# Patient Record
Sex: MALE | Race: WHITE
[De-identification: names, ages, dates, MRNs, and addresses within clinical notes are randomized per-mention and may not be internally consistent; named-entity substitution may affect disease eponyms.]

---

## 2017-04-23 ENCOUNTER — HOSPITAL ENCOUNTER (EMERGENCY)
Dept: HOSPITAL 74 - JERFT | Age: 37
Discharge: HOME | End: 2017-04-23
Payer: COMMERCIAL

## 2017-04-23 VITALS — DIASTOLIC BLOOD PRESSURE: 85 MMHG | SYSTOLIC BLOOD PRESSURE: 155 MMHG | TEMPERATURE: 98 F | HEART RATE: 75 BPM

## 2017-04-23 VITALS — BODY MASS INDEX: 30.9 KG/M2

## 2017-04-23 DIAGNOSIS — S93.411A: Primary | ICD-10-CM
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
## 2017-04-23 NOTE — PDOC
History of Present Illness





- General


Chief Complaint: Pain, Acute


Stated Complaint: RT ANKLE INJURY


Time Seen by Provider: 04/23/17 17:12


History Source: Patient


Exam Limitations: No Limitations





- History of Present Illness


Initial Comments: 





04/23/17 17:52


36-year-old male presents with right ankle pain after tripping and falling 

while at work yesterday. Patient is been applying ice and topical BenGay with 

no relief and now has difficulty going up and down steps. Patient denies 

previous injury to the affected area,  radiation of pain, or decreased 

sensation to the toes. 


Occurred: reports: yesterday


Severity: reports: mild


Pain Location: reports: lower extremity


Method of Injury: Yes: fall


Modifying Factors: improves with: None


Associated Symptoms (Fall): trouble walking





Past History





- Past Medical History


Allergies/Adverse Reactions: 


 Allergies











Allergy/AdvReac Type Severity Reaction Status Date / Time


 


No Known Allergies Allergy   Verified 04/23/17 16:45











Home Medications: 


Ambulatory Orders





NK [No Known Home Medication]  04/23/17 








Other medical history: DENIES.





- Psycho/Social/Smoking Cessation Hx


Anxiety: No


Suicidal Ideation: No


Smoking History: Never smoked


Hx Alcohol Use: Yes (SOMETIMES.)


Drug/Substance Use Hx: Yes (SOMETIMES "WEED".)


Substance Use Type: Alcohol, Marijuana


Patient Lives Alone: No


Lives with/in: spouse/SO





**Review of Systems





- Review of Systems


Able to Perform ROS?: Yes


Constitutional: No: Symptoms Reported


Musculoskeletal: Yes: Joint Pain (right ankle)


Integumentary: Yes: Bruising


Neurological: No: Symptoms reported





*Physical Exam





- Vital Signs


 Last Vital Signs











Temp Pulse Resp BP Pulse Ox


 


 98 F   75   18   155/85   100 


 


 04/23/17 16:45  04/23/17 16:45  04/23/17 16:45  04/23/17 16:45  04/23/17 16:45














- Physical Exam


General Appearance: Yes: Nourished, Appropriately Dressed.  No: Apparent 

Distress


Vascular Pulses: Dorsalis-Pedis (R): 2+


Extremity: positive: Normal Capillary Refill, Tender (lower lateral aspect of 

right mallelous )


Integumentary: positive: Ecchymosis (mild to right ankle region)


Neurologic: positive: Motor Strength 5/5 (ambulatory with limp)





ED Treatment Course





- RADIOLOGY


Radiology Studies Ordered: 














 Category Date Time Status


 


 ANKLE-RIGHT [RAD] Stat Radiology  04/23/17 17:31 Ordered














Medical Decision Making





- Medical Decision Making


04/23/17 17:57


Patient with injury to right ankle with edema, tenderness and ecchymosis to the 

lateral aspect of right malleolus. Patient ordered for x-ray and Motrin.





04/23/17 18:07


X-ray negative for fracture or dislocation or other acute pathology. Patient 

will be discharged home with an Ace wrap and recommended to follow the rice 

formula





*DC/Admit/Observation/Transfer


Diagnosis at time of Disposition: 


Right ankle sprain


Qualifiers:


 Encounter type: initial encounter Involved ligament of ankle: calcaneofibular 

ligament Qualified Code(s): S93.411A - Sprain of calcaneofibular ligament of 

right ankle, initial encounter





- Discharge Dispostion


Disposition: HOME


Condition at time of disposition: Good





- Referrals


Referrals: 


STAFF,NOT ON [Primary Care Provider] - 





- Patient Instructions


Printed Discharge Instructions:  DI for Ankle Sprain


Additional Instructions: 


Please use Ace wrap during the day but remove at night.


Elevate your extremity and apply ice to the ankle as much as you can tolerate 

for the next 2 days.


Take Motrin 400 mg every 8 hours.

## 2018-07-28 ENCOUNTER — HOSPITAL ENCOUNTER (EMERGENCY)
Dept: HOSPITAL 74 - JER | Age: 38
Discharge: HOME | End: 2018-07-28
Payer: COMMERCIAL

## 2018-07-28 VITALS — SYSTOLIC BLOOD PRESSURE: 141 MMHG | HEART RATE: 62 BPM | DIASTOLIC BLOOD PRESSURE: 101 MMHG

## 2018-07-28 VITALS — TEMPERATURE: 98 F

## 2018-07-28 VITALS — BODY MASS INDEX: 29.9 KG/M2

## 2018-07-28 DIAGNOSIS — F10.239: Primary | ICD-10-CM
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
## 2018-07-28 LAB
ALBUMIN SERPL-MCNC: 4.3 G/DL (ref 3.4–5)
ALP SERPL-CCNC: 73 U/L (ref 45–117)
ALT SERPL-CCNC: 67 U/L (ref 12–78)
ANION GAP SERPL CALC-SCNC: 8 MMOL/L (ref 8–16)
AST SERPL-CCNC: 21 U/L (ref 15–37)
BASOPHILS # BLD: 0.5 % (ref 0–2)
BILIRUB SERPL-MCNC: 0.6 MG/DL (ref 0.2–1)
BUN SERPL-MCNC: 8 MG/DL (ref 7–18)
CALCIUM SERPL-MCNC: 9.2 MG/DL (ref 8.5–10.1)
CHLORIDE SERPL-SCNC: 107 MMOL/L (ref 98–107)
CO2 SERPL-SCNC: 28 MMOL/L (ref 21–32)
CREAT SERPL-MCNC: 0.8 MG/DL (ref 0.7–1.3)
DEPRECATED RDW RBC AUTO: 14.5 % (ref 11.9–15.9)
EOSINOPHIL # BLD: 0.7 % (ref 0–4.5)
GLUCOSE SERPL-MCNC: 94 MG/DL (ref 74–106)
HCT VFR BLD CALC: 46.1 % (ref 35.4–49)
HGB BLD-MCNC: 15.1 GM/DL (ref 11.7–16.9)
LYMPHOCYTES # BLD: 26.2 % (ref 8–40)
MCH RBC QN AUTO: 27.2 PG (ref 25.7–33.7)
MCHC RBC AUTO-ENTMCNC: 32.8 G/DL (ref 32–35.9)
MCV RBC: 83 FL (ref 80–96)
MONOCYTES # BLD AUTO: 7.8 % (ref 3.8–10.2)
NEUTROPHILS # BLD: 64.8 % (ref 42.8–82.8)
PLATELET # BLD AUTO: 259 K/MM3 (ref 134–434)
PMV BLD: 8.1 FL (ref 7.5–11.1)
POTASSIUM SERPLBLD-SCNC: 4.2 MMOL/L (ref 3.5–5.1)
PROT SERPL-MCNC: 7.4 G/DL (ref 6.4–8.2)
RBC # BLD AUTO: 5.55 M/MM3 (ref 4–5.6)
SODIUM SERPL-SCNC: 143 MMOL/L (ref 136–145)
WBC # BLD AUTO: 7.2 K/MM3 (ref 4–10)

## 2018-07-28 NOTE — PDOC
Attending Attestation





- Resident


Resident Name: WimisaRafael





- ED Attending Attestation


I have performed the following: I have examined & evaluated the patient, The 

case was reviewed & discussed with the resident, I agree w/resident's findings 

& plan, Exceptions are as noted





- HPI


HPI: 





18 16:13


The patient is a 37 year old male with no significant PMH presenting who 

presents to the ED with twinges under the bilateral armpits and shortness of 

breath. The patient reports the twinges started under the right armpit and 

occasionally under the left armpit with a 4/10 in severity. The patient states 

the twinges are more frequent today and is now experiencing shortness of breath 

prompting him to come to the ER. Patient denies pain to the arm or exacerbation 

with movement. Patient denies any trauma to the area. He also reports feeling 

anxious and sweaty. The patient states he was in the [place] for two 

weeks and returned two days ago. The patient reports he has been binge drinking 

every day for the past two weeks and last drank alcohol 2 days ago. Patient 

denies any family history of cardiac illnesses. Denies  hx elevated BP although 

his BP here is high. 


 


The patient endorses diaphoresis, chills, and diarrhea.


The patient denies chest pain, shortness of breath, headache and dizziness.


Denies leg swelling, fever, nausea, vomit, and constipation.


Denies dysuria, frequency, urgency and hematuria.





Allergies: NKA


Past surgical history:  None reported. 


Social history: No reported alcohol or cigarette use. 5 blunts of weed per day. 


Family hx: father  from etoh abuse








- Physicial Exam


PE: 





18 16:31


GENERAL: Awake, alert, and fully oriented, mildly diaphoretic


HEAD: No signs of trauma


EYES: PERRLA, EOMI, sclera anicteric, conjunctiva clear


ENT: Auricles normal inspection, hearing grossly normal, nares patent, 

oropharynx clear without exudates. Moist mucosa


NECK: Normal ROM, supple, no lymphadenopathy, JVD, or masses


LUNGS: Breath sounds equal, clear to auscultation bilaterally.  No wheezes, and 

no crackles


HEART: Regular rate and rhythm, normal S1 and S2, no murmurs, rubs or gallops


ABDOMEN: Soft, nontender, normoactive bowel sounds.  No guarding, no rebound.  

No masses


EXTREMITIES: Normal range of motion, no edema.  No clubbing or cyanosis. No 

cords, erythema, or tenderness


NEUROLOGICAL:  Normal speech, cranial nerves intact, negative pronator drift, 5/

5 strength in all 4 extremities, normal sensation to light touch in all 4 

extremities, normal cerebellar exam, normal gait, normal reflexes and tone. No 

agitation


SKIN: Warm, Dry, normal turgor, no rashes or lesions noted.





- Medical Decision Making





18 16:34


37-year-old male with no significant past medical history presents to the 

emergency department with multiple vague symptoms including twinges in his 

bilateral axilla, anxiety, diaphoresis, and shortness of breath. Vitals 

remarkable for hypertension. Given timing of recent binge drinking while on 

vacation and last drink being 2 days ago, symptoms are likely secondary to mild 

alcohol withdrawal. CIWA score in the emergency department is 3. Pt treated 

with librium with good response, feels significantly better. Labs, XR wnl. 





Given withdrawal symptoms, pt offered detox, but declines. In light of mild sxs 

and low ciwa score, we called [facility] to discuss outpt management of mild 

etoh withdrawal. Case discussed with KEVIN Dennis who recommended that based on last 

drink being 2 days ago and low ciwa, pt can be discharged on low librium taper 

of 10-15mg TID on day 1, BID on day 2, and single dose on day 3.





Plan discussed with patient, and spent some time discussing etoh abuse/

withdrawal. Pt counseled on drinking etoh in moderation and receptive of 

counseling. 





I discussed the physical exam findings, ancillary test results and final 

diagnoses with the patient. I answered all of the patient's questions. The 

patient was satisfied with the care received and felt comfortable with the 

discharge plan and treatment plan. The patient will call their primary care 

physician within 24 hours to arrange follow-up and will return to the Emergency 

Department with any new, persistent or worsening symptoms. 





**Discharge Disposition





- Diagnosis


 Alcohol withdrawal








- Discharge Dispostion


Disposition: HOME


Condition at time of disposition: Improved


Decision to Admit order: No





- Prescriptions


Prescriptions: 


Chlordiazepoxide [Librium -] 10 mg PO BID PRN #5 capsule MDD 3 capsule


 PRN Reason: Withdrawal(Cont Subst)





- Referrals





- Patient Instructions


Additional Instructions: 


As discussed, take the prescribed medication (librium) if you have recurrent 

symptoms such as shakiness, sweatiness, anxiety. You may take 1 pill every 8 

hours on day 1. On day 2, take 1 pill every 12 hours. On day 3, take 1 pill. Do 

not drive while taking this medication. 





Drink plenty of fluids and stay hydrated. Your symptoms are due to alcohol 

withdrawal. Remember to consume alcohol in moderation and refrain from "binge" 

drinking as we discussed. It was a pleasure to take care of you in the 

emergency department today. Return to the emergency department if you have any 

new, worsening, or concerning symptoms. 





- Post Discharge Activity





**Heart Score/ECG Review


  ** #1





18 16:34


Twelve-lead EKG was performed and reviewed by me. Sinus bradycardia, rate 53. 

Normal axis and intervals. No ST elevations or T-wave inversions.

## 2018-07-28 NOTE — PDOC
History of Present Illness





- General


Chief Complaint: Palpitations


Stated Complaint: CHEST PAIN


Time Seen by Provider: 07/28/18 13:01





- History of Present Illness


Initial Comments: 





07/28/18 13:38


38 yo M w no pmh is here with vague left substernal chest pain, palpitations, 

lightheadedness, mild SOB, profuse diaphoresis. He claims he's had the chest 

fluttering sensation for the past 6 months, but he came in today bc it has 

gotten acutely worse in the past 2 days to the point where it has been driving 

him nuts and he's having a hard time sleeping. He also reports diarrhea for the 

past 2 days - non bloody. 


He recently returned from the [place] 2 days ago.


He denies recent fevers, nausea or vomiting, dizziness or passing out. Also 

denies abdominal pain. 


He admits to significant alcohol and marijuana usage. He recently took an 

adderall from a friend and from time to time takes other pills, however none 

recently. 




















07/28/18 13:57








Past History





- Past Medical History


Allergies/Adverse Reactions: 


 Allergies











Allergy/AdvReac Type Severity Reaction Status Date / Time


 


No Known Allergies Allergy   Verified 07/28/18 12:31











Home Medications: 


Ambulatory Orders





Chlordiazepoxide [Librium -] 10 mg PO BID PRN #5 capsule MDD 3 capsule 07/28/18 








COPD: No





- Suicide/Smoking/Psychosocial Hx


Smoking History: Never smoked


Information on smoking cessation initiated: No


Hx Alcohol Use: Yes (SOMETIMES.)


Drug/Substance Use Hx: Yes (SOMETIMES "WEED".)


Substance Use Type: Alcohol, Marijuana





**Review of Systems





- Review of Systems


Comments:: 





07/28/18 14:21





CONSTITUTIONAL:


Positive: diaphoresis


Absent: fever, chills, generalized weakness, malaise, loss of appetite


HEENT:


Absent: rhinorrhea, nasal congestion, throat pain, throat swelling, difficulty 

swallowing,


mouth swelling, ear pain, eye pain, visual Changes


CARDIOVASCULAR:


Positive: Chest pain, lightheadedness


Absent: syncope, palpitations, irregular heart rate, peripheral edema


RESPIRATORY:


Positive: shortness of breath


Absent: cough, dyspnea with exertion, orthopnea, wheezing, stridor, hemoptysis


GASTROINTESTINAL:


Positive: diarrhea


Absent: abdominal pain, abdominal distension, nausea, vomiting, constipation, 

melena, hematochezia


GENITOURINARY:


Absent: dysuria, frequency, urgency, hesitancy, hematuria, flank pain, genital 

pain


MUSCULOSKELETAL:


Absent: myalgia, arthralgia, joint swelling


SKIN:


Absent: rash, itching, pallor


HEMATOLOGIC/IMMUNOLOGIC:


Absent: easy bleeding, easy bruising, lymphadenopathy, frequent infections


ENDOCRINE:


Absent: unexplained weight gain, unexplained weight loss, heat intolerance, 

cold intolerance


NEUROLOGIC:


Positive: occasional arm paresthesias


Absent: headache, focal weakness, dizziness, unsteady gait, seizure, mental 

status changes, bladder or bowel incontinence


PSYCHIATRIC:


Absent: anxiety, depression, suicidal or homicidal ideation, hallucinations.





*Physical Exam





- Vital Signs


 Last Vital Signs











Temp Pulse Resp BP Pulse Ox


 


 98 F   62   18   141/101   100 


 


 07/28/18 12:29  07/28/18 16:23  07/28/18 16:23  07/28/18 16:23  07/28/18 16:23














- Physical Exam


Comments: 





07/28/18 14:23





GENERAL:


Patient is sweating all over. profusely diaphoretic. Otherwise, well developed, 

well nourished. Awake and alert. No acute distress.


HEENT:


Normocephalic, atraumatic. PERRLA, EOMI. No conjunctival pallor. Sclera are non-

icteric.


Moist mucous membranes. Oropharynx is clear.


NECK:


Supple. Full ROM. No JVD. Carotid pulses 2+ and symmetric, without bruits. No


thyromegaly. No lymphadenopathy.


CARDIOVASCULAR:


Regular rate and rhythm. No murmurs, rubs, or gallops. Distal pulses are 2+ and


symmetric.


PULMONARY:


No evidence of respiratory distress. Lungs clear to auscultation bilaterally. 

No wheezing,


rales or rhonchi.


ABDOMINAL:


Soft. Non-tender. Non-distended. No rebound or guarding. No organomegaly. 

Normoactive


bowel sounds.


MUSCULOSKELETAL


Normal range of motion at all joints. No bony deformities or tenderness. No CVA


tenderness.


EXTREMITIES:


No cyanosis. No clubbing. No edema. No calf tenderness.


SKIN:


Warm and diaphoretic. Normal capillary refill. No rashes. No jaundice.


NEUROLOGICAL:


Alert, awake, appropriate. Cranial nerves 2-12 intact. No deficits to light 

touch and


temperature in face, upper extremities and lower extremities. No motor deficits 

in the in


face, upper extremities and lower extremities. Normal speech.  Gait is normal 

without ataxia.


PSYCHIATRIC:


Cooperative. Good eye contact. Appropriate mood and affect.





ED Treatment Course





- LABORATORY


CBC & Chemistry Diagram: 


 07/28/18 14:14





 07/28/18 14:00





- ADDITIONAL ORDERS


Additional order review: 


 Laboratory  Results











  07/28/18 07/28/18





  14:00 14:00


 


Sodium   143


 


Potassium   4.2


 


Chloride   107


 


Carbon Dioxide   28


 


Anion Gap   8


 


BUN   8


 


Creatinine   0.8


 


Creat Clearance w eGFR   > 60


 


Random Glucose   94


 


Calcium   9.2


 


Total Bilirubin   0.6


 


AST   21


 


ALT   67


 


Alkaline Phosphatase   73


 


Creatine Kinase   97


 


Troponin I   < 0.02


 


Total Protein   7.4


 


Albumin   4.3


 


TSH  Cancelled  0.77








 











  07/28/18 07/28/18





  14:14 14:00


 


RBC  5.55  Cancelled


 


MCV  83.0  Cancelled


 


MCHC  32.8  Cancelled


 


RDW  14.5  Cancelled


 


MPV  8.1  Cancelled


 


Neutrophils %  64.8  Cancelled


 


Lymphocytes %  26.2  Cancelled


 


Monocytes %  7.8  Cancelled


 


Eosinophils %  0.7  Cancelled


 


Basophils %  0.5  Cancelled














- RADIOLOGY


Radiology Studies Ordered: 














 Category Date Time Status


 


 CHEST PA & LAT [RAD] Stat Radiology  07/28/18 13:24 Completed














- Medications


Given in the ED: 


ED Medications














Discontinued Medications














Generic Name Dose Route Start Last Admin





  Trade Name Freq  PRN Reason Stop Dose Admin


 


Chlordiazepoxide HCl  50 mg  07/28/18 14:39  07/28/18 14:51





  Librium -  PO  07/28/18 14:40  50 mg





  ONCE ONE   Administration





     





     





     





     














Medical Decision Making





- Medical Decision Making





07/28/18 14:25


38 yo M w no sig pmh here with diaphoresis, chest pain/fluttering sensation, 

diarrhea, random arm paresthesias. 


The chest fluttering sensation has been present for 6 months, but has acutely 

worsened in past 2 days. he recently traveled to the  and drank heavily while 

on vacation. He denies drinks the past 2 days. His HTN, diaphoresis, and chest 

fluttering could be explained by alcohol withdrawal. 


CIWA -  5





He also has diarrhea. Chest fluttering could be due to dehydration/electrolyte 

abnormality/gastroenteritis. 


He recently took adderall given to him by a friend. Could also be the cause of 

his chest fluttering sensation. 


Low suspicion for ACS. 





Plan: Cbc, Cmp, Tsh, Trop, CXR, EKG, librium, Re-assess.





Patient has been counseled about alcohol withdrawal, and advised to stop 

drinking. He understands and says he will make an active effort to cut back.  


07/28/18 14:37





07/28/18 15:22








*DC/Admit/Observation/Transfer


Diagnosis at time of Disposition: 


 Alcohol withdrawal








- Discharge Dispostion


Disposition: HOME


Condition at time of disposition: Improved


Decision to Admit order: No





- Prescriptions


Prescriptions: 


Chlordiazepoxide [Librium -] 10 mg PO BID PRN #5 capsule MDD 3 capsule


 PRN Reason: Withdrawal(Cont Subst)





- Referrals


Referrals: 


ON STAFF,NOT [Primary Care Provider] - 





- Patient Instructions


Additional Instructions: 


As discussed, take the prescribed medication (librium) if you have recurrent 

symptoms such as shakiness, sweatiness, anxiety. You may take 1 pill every 8 

hours on day 1. On day 2, take 1 pill every 12 hours. On day 3, take 1 pill. Do 

not drive while taking this medication. 





Drink plenty of fluids and stay hydrated. Your symptoms are due to alcohol 

withdrawal. Remember to consume alcohol in moderation and refrain from "binge" 

drinking as we discussed. It was a pleasure to take care of you in the 

emergency department today. Return to the emergency department if you have any 

new, worsening, or concerning symptoms. 





- Post Discharge Activity

## 2018-07-30 NOTE — EKG
Test Reason : 

Blood Pressure : ***/*** mmHG

Vent. Rate : 053 BPM     Atrial Rate : 053 BPM

   P-R Int : 184 ms          QRS Dur : 090 ms

    QT Int : 432 ms       P-R-T Axes : 010 059 037 degrees

   QTc Int : 405 ms

 

SINUS BRADYCARDIA

OTHERWISE NORMAL ECG

WHEN COMPARED WITH ECG OF 26-AUG-2007 12:25,

NO SIGNIFICANT CHANGE WAS FOUND

Confirmed by KATHLEEN JOHNSON MD ([id number]) on 7/30/2018 10:33:55 AM

 

Referred By:             Confirmed By:KATHLEEN JOHNSON MD